# Patient Record
Sex: MALE | URBAN - METROPOLITAN AREA
[De-identification: names, ages, dates, MRNs, and addresses within clinical notes are randomized per-mention and may not be internally consistent; named-entity substitution may affect disease eponyms.]

---

## 2021-06-07 ENCOUNTER — OFFICE VISIT (OUTPATIENT)
Dept: URBAN - METROPOLITAN AREA CLINIC 17 | Facility: CLINIC | Age: 66
End: 2021-06-07
Payer: COMMERCIAL

## 2021-06-07 DIAGNOSIS — H52.4 PRESBYOPIA: Primary | ICD-10-CM

## 2021-06-07 PROCEDURE — 92310 CONTACT LENS FITTING OU: CPT | Performed by: OPTOMETRIST

## 2021-06-07 PROCEDURE — 92004 COMPRE OPH EXAM NEW PT 1/>: CPT | Performed by: OPTOMETRIST

## 2021-06-07 ASSESSMENT — KERATOMETRY
OD: 45.50
OS: 44.88

## 2021-06-07 ASSESSMENT — INTRAOCULAR PRESSURE
OD: 16
OS: 14

## 2021-06-07 ASSESSMENT — VISUAL ACUITY
OS: 20/25
OD: 20/25

## 2021-06-07 NOTE — IMPRESSION/PLAN
Impression: Presbyopia: H52.4. Plan: Finalized new glasses Rx. Patient education on appropriate options of eye glasses. Return to clinic in 1 year for complete eye exam and refraction. Pt was advised unable to dispense finalized CL RX due to the different fittings that Dr. Miah Nevarez may have to do to make sure the lens is correct for the patient. Pt was advised if uses an online source to do the fittings they would have to agree to make and remake CL's.